# Patient Record
Sex: MALE | Race: WHITE | URBAN - NONMETROPOLITAN AREA
[De-identification: names, ages, dates, MRNs, and addresses within clinical notes are randomized per-mention and may not be internally consistent; named-entity substitution may affect disease eponyms.]

---

## 2017-05-08 NOTE — PATIENT DISCUSSION
Continue: Refresh Celluvisc (carboxymethylcellulose sodium): dropperette,gel: 1% 1 drop twice a day into left eye

## 2017-05-26 NOTE — PATIENT DISCUSSION
Axis off 90 degrees, and increased cylinder power OS.  Update specs with [place] [facility] in [address].

## 2017-12-27 NOTE — PATIENT DISCUSSION
Would recommend use of Laser during cataract surgery (high amount of astigmatism) and monocular patient

## 2018-02-08 NOTE — PATIENT DISCUSSION
RETINA IS ATTACHED OS: PVD OS; NO HOLES OR TEARS SEEN ON DILATED EXAM TODAY.  RETINAL DETACHMENT SIGNS AND SYMPTOMS REVIEWED

## 2018-02-08 NOTE — PATIENT DISCUSSION
NO DIABETIC RETINOPATHY NOTED ON TODAYS EXAM: RETURN FOR FOLLOW-UP AS SCHEDULED FOR DILATED EXAM AND OCT FOR ANY PROGRESSION WITH DR Grady Avalos.

## 2018-05-02 NOTE — PATIENT DISCUSSION
Patient had Gastric By Pass 2 - 3 weeks ago, lost 26 lbs - watch and monitor vision over few  months  and see how he's doing.

## 2021-05-07 ENCOUNTER — IMPORTED ENCOUNTER (OUTPATIENT)
Dept: URBAN - NONMETROPOLITAN AREA CLINIC 1 | Facility: CLINIC | Age: 76
End: 2021-05-07

## 2021-05-07 PROBLEM — H52.4: Noted: 2021-05-07

## 2021-05-07 PROBLEM — H25.13: Noted: 2021-05-19

## 2021-05-07 PROBLEM — H52.221: Noted: 2021-05-07

## 2021-05-07 PROBLEM — H52.03: Noted: 2021-05-07

## 2021-05-07 PROBLEM — H40.023: Noted: 2021-05-19

## 2021-05-07 PROBLEM — H40.023: Noted: 2021-05-07

## 2021-05-07 PROBLEM — H25.13: Noted: 2021-05-07

## 2021-05-07 PROCEDURE — 92004 COMPRE OPH EXAM NEW PT 1/>: CPT

## 2021-05-07 PROCEDURE — 92015 DETERMINE REFRACTIVE STATE: CPT

## 2021-05-07 NOTE — PATIENT DISCUSSION
Cataracts OU-  discussed findings w/patient-  no treatment indicated at this time-  UV protection recommended-  continue to monitor yearly or prnPOAG-S -  discussed findings w/patient-  IOPs 17 OU-  c/d .8/.85 & .75/.75-  no family hx noted-  need to establish baseline testing-  RTC 2-3 weeks f/u w/OCT ON and PachCompound Hyperopic Astigmatism OD/Simple Hyperopia OS w/Presbyopia-  discussed findings w/patient-  new spectacle Rx issued-  continue to monitor as scheduled or prn; 's Notes:  5/7/2021D 5/7/2021

## 2021-05-19 ENCOUNTER — IMPORTED ENCOUNTER (OUTPATIENT)
Dept: URBAN - NONMETROPOLITAN AREA CLINIC 1 | Facility: CLINIC | Age: 76
End: 2021-05-19

## 2021-05-19 PROCEDURE — 76514 ECHO EXAM OF EYE THICKNESS: CPT

## 2021-05-19 PROCEDURE — 92133 CPTRZD OPH DX IMG PST SGM ON: CPT

## 2021-05-19 PROCEDURE — 99213 OFFICE O/P EST LOW 20 MIN: CPT

## 2021-05-19 NOTE — PATIENT DISCUSSION
POAG-S -  discussed findings w/patient-  IOPs 17 OU-  c/d .8/.85 & .75/.75-  family hx-father had glaucoma-  Pach done by [facility]    OD: 563    OS: 570-  OCT ON done 5/19/2021    OD: 79um 8/10 SS normal RNFL all quadrants    OS: 83um 6/10 SS normal RNFL nasal superior inferior mild thinning     temporal-  will continue w/o treatment at this time-  RTC 6 mo f/u w/24-2 VF & GonioCataracts OU-  discussed findings w/patient-  patient would like to have cat eval-  refer to [address] [place] [address] for n/a cat eval; 's Notes: MR 5/7/2021DFE 5/7/2021OCT ON 5/19/2021Pach  570

## 2021-05-19 NOTE — PATIENT DISCUSSION
POAG-S -  discussed findings w/patient-  IOPs 17 OU-  c/d .8/.85 & .75/.75-  family hx-father had glaucoma-  Pach done by [facility]    OD: 563    OS: 570-  OCT ON done 5/19/2021    OD: 79um 8/10 SS normal RNFL all quadrants    OS: 83um 6/10 SS normal RNFL nasal superior inferior mild thinning     temporal-  will continue w/o treatment at this time-  RTC 6 mo f/u w/24-2 VF & GonioCataracts OU-  discussed findings w/patient-  patient would like to have cat eval-  refer to [address] [place] [address] for n/a cat eval; 's Notes: MR 5/7/2021DFE 5/7/2021OCT ON 5/19/2021Pach  570

## 2021-08-13 ENCOUNTER — IMPORTED ENCOUNTER (OUTPATIENT)
Dept: URBAN - NONMETROPOLITAN AREA CLINIC 1 | Facility: CLINIC | Age: 76
End: 2021-08-13

## 2021-08-13 PROBLEM — H52.03: Noted: 2021-05-07

## 2021-08-13 PROBLEM — H52.4: Noted: 2021-05-07

## 2021-08-13 PROBLEM — H52.221: Noted: 2021-05-07

## 2021-08-13 PROBLEM — H40.023: Noted: 2021-08-13

## 2021-08-13 PROBLEM — H25.13: Noted: 2021-08-13

## 2021-08-13 PROCEDURE — 92014 COMPRE OPH EXAM EST PT 1/>: CPT

## 2021-08-13 NOTE — PATIENT DISCUSSION
Cataract(s)-Visually significant cataract OU .-Cataract(s) causing symptomatic impairment of visual function not correctable with a tolerable change in glasses or contact lenses lighting or non-operative means resulting in specific activity limitations and/or participation restrictions including but not limited to reading viewing television driving or meeting vocational or recreational needs. -Expectation is clearer vision and functional improvement in symptoms as well as reduced glare disability after cataract removal.-Order IOLMaster and OPD today. -Recommend Toric IOL   /   Limbal Relaxing Incisions based on today's OPD testing and lifestyle questionnaire.-All questions were answered regarding surgery including pre and post-op medications appointments activity restrictions and anesthetic usage.-The risks benefits and alternatives and special risk factors for the patient were discussed in detail including but not limited to: bleeding infection retinal detachment vitreous loss problems with the implant and possible need for additional surgery.-Although rare the possibility of complete vision loss was discussed.-The possible need for glasses post-operatively was discussed.-Order medical clearance exam based on history of high cholesterol and hBP -Patient elects to proceed with cataract surgery OS . Will schedule at patient's convenience and re-evaluate OD  in the future. Discussed lens options Qualifies for Toric discussed lens and explained will need reading glasses Pt elects LenSx OU Post op inflammation anticipated discussed dextenza insertion after surgery; 's Notes: MR 5/7/2021DFE 5/7/2021OCT ON 5/19/2021Pach  570

## 2021-09-20 PROBLEM — H40.023: Noted: 2021-09-20

## 2021-09-20 PROBLEM — H52.03: Noted: 2021-09-20

## 2021-09-20 PROBLEM — H52.221: Noted: 2021-09-20

## 2021-09-20 PROBLEM — H25.13: Noted: 2021-09-20

## 2021-09-21 ENCOUNTER — IMPORTED ENCOUNTER (OUTPATIENT)
Dept: URBAN - NONMETROPOLITAN AREA CLINIC 1 | Facility: CLINIC | Age: 76
End: 2021-09-21

## 2022-04-09 ASSESSMENT — VISUAL ACUITY
OS_PH: 20/80
OU_CC: 20/30
OD_SC: 20/25
OD_GLARE: 20/90-1
OU_SC: 20/25
OS_AM: 20/25
OD_SC: 20/25
OD_PH: 20/50
OS_CC: 20/30
OS_CC: 20/90
OU_CC: 20/30
OD_CC: 20/30
OS_SC: 20/50
OS_CC: 20/150
OS_SC: 20/60
OD_PAM: 20/25
OU_SC: 20/25
OD_CC: 20/150
OS_GLARE: 20/90-1
OU_CC: 20/70

## 2022-04-09 ASSESSMENT — TONOMETRY
OS_IOP_MMHG: 16
OD_IOP_MMHG: 16
OD_IOP_MMHG: 17
OS_IOP_MMHG: 17
OD_IOP_MMHG: 16
OS_IOP_MMHG: 16

## 2022-04-09 ASSESSMENT — PACHYMETRY
OS_CT_UM: 570; ADJ: WNL
OD_CT_UM: 563; ADJ: WNL

## 2022-08-17 NOTE — PATIENT DISCUSSION
Patient requests new refraction that was completed today. Patient would like the lens to match his current glasses.

## 2022-08-17 NOTE — PATIENT DISCUSSION
"Prescribed artificial tears. Warned of possible ""flare ups"" associated with inflammation of pinguecula. Can send a steroidal as needed for ""bad"" days. No surgical intervention at this time. "

## 2023-10-17 NOTE — PATIENT DISCUSSION
[facility] [phone] MRI order faxed through [facility] to  [address] [facility] at : [phone]